# Patient Record
Sex: FEMALE | URBAN - METROPOLITAN AREA
[De-identification: names, ages, dates, MRNs, and addresses within clinical notes are randomized per-mention and may not be internally consistent; named-entity substitution may affect disease eponyms.]

---

## 2024-10-01 ENCOUNTER — APPOINTMENT (RX ONLY)
Dept: URBAN - METROPOLITAN AREA CLINIC 114 | Facility: CLINIC | Age: 39
Setting detail: DERMATOLOGY
End: 2024-10-01

## 2024-10-01 VITALS — HEIGHT: 61 IN | WEIGHT: 157 LBS

## 2024-10-01 PROBLEM — D23.62 OTHER BENIGN NEOPLASM OF SKIN OF LEFT UPPER LIMB, INCLUDING SHOULDER: Status: ACTIVE | Noted: 2024-10-01

## 2024-10-01 PROCEDURE — ? CPT CODE GENERATOR

## 2024-10-01 PROCEDURE — ? DEFER

## 2024-10-01 PROCEDURE — 99202 OFFICE O/P NEW SF 15 MIN: CPT

## 2024-10-01 PROCEDURE — ? COUNSELING

## 2024-10-01 NOTE — PROCEDURE: CPT CODE GENERATOR
First Biopsy Type: Tangential Biopsy
Corticotropin (J-Code: ) Price: 0.00
First Procedure You Would Like A Quote For?: Benign Excision
Show Premalignant Destruction Codes?: No
Fee Schedule Discount For Cash Pay Patients In % Off Of Fee Schedule: 0
J Code Units: 1
Anticipated Depth And Size Of Soft Tissue Excision (Required): Subcutaneous, Less than 3 cm
Which Photosensitizer Was Used?: Levulan
How Many Lesions Are You Destroying?: Less than 15
Location (Required): Extremities
Number Of Lesions Injected: Less than 8 lesions
Anticipated Depth And Size Of Soft Tissue Excision (Required): Subcutaneous, Less than 1.5 cm
Repair: Complex Primary Closure
Anticipated Wound Length (Required): 2.6-7.5 cm
Detail Level: None
Anticipated Excised Diameter (Required): 1.1 - 2.0 cm
Fee Schedule Discount In % Off Of Fee Schedule: Standard Fee Schedule as Entered in Pricing Tab
Anticipated Depth And Size Of Soft Tissue Excision (Required): Subcutaneous, Less than 2 cm

## 2024-10-01 NOTE — PROCEDURE: DEFER
X Size Of Lesion In Cm (Optional): 0
Procedure To Be Performed At Next Visit: Excision
Introduction Text (Please End With A Colon): The following procedure was deferred: Pt needs to schedule a separate appointment.
Instructions (Optional): 1.5cm and complex closure 4cm
Size Of Lesion In Cm (Optional): 1.5
Detail Level: Detailed